# Patient Record
Sex: FEMALE | Race: WHITE | NOT HISPANIC OR LATINO | URBAN - METROPOLITAN AREA
[De-identification: names, ages, dates, MRNs, and addresses within clinical notes are randomized per-mention and may not be internally consistent; named-entity substitution may affect disease eponyms.]

---
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
## 2023-04-21 PROBLEM — Z00.00 ENCOUNTER FOR PREVENTIVE HEALTH EXAMINATION: Status: ACTIVE | Noted: 2023-04-21

## 2023-05-05 ENCOUNTER — APPOINTMENT (OUTPATIENT)
Dept: ENDOCRINOLOGY | Facility: CLINIC | Age: 23
End: 2023-05-05
Payer: COMMERCIAL

## 2023-05-05 VITALS
HEART RATE: 70 BPM | HEIGHT: 62 IN | TEMPERATURE: 97.4 F | WEIGHT: 174 LBS | SYSTOLIC BLOOD PRESSURE: 118 MMHG | BODY MASS INDEX: 32.02 KG/M2 | DIASTOLIC BLOOD PRESSURE: 72 MMHG | OXYGEN SATURATION: 98 %

## 2023-05-05 DIAGNOSIS — N92.6 IRREGULAR MENSTRUATION, UNSPECIFIED: ICD-10-CM

## 2023-05-05 DIAGNOSIS — E88.81 METABOLIC SYNDROME: ICD-10-CM

## 2023-05-05 DIAGNOSIS — Z78.9 OTHER SPECIFIED HEALTH STATUS: ICD-10-CM

## 2023-05-05 DIAGNOSIS — J45.909 UNSPECIFIED ASTHMA, UNCOMPLICATED: ICD-10-CM

## 2023-05-05 DIAGNOSIS — R63.5 ABNORMAL WEIGHT GAIN: ICD-10-CM

## 2023-05-05 PROCEDURE — 99204 OFFICE O/P NEW MOD 45 MIN: CPT

## 2023-05-06 PROBLEM — J45.909 ASTHMA: Status: ACTIVE | Noted: 2023-05-05

## 2023-05-06 PROBLEM — N92.6 IRREGULAR PERIODS: Status: ACTIVE | Noted: 2023-05-06

## 2023-05-06 PROBLEM — E88.81 INSULIN RESISTANCE: Status: ACTIVE | Noted: 2023-05-06

## 2023-05-06 PROBLEM — Z78.9 NON-SMOKER: Status: ACTIVE | Noted: 2023-05-05

## 2023-05-06 NOTE — ASSESSMENT
[FreeTextEntry1] : 22 year old female with history of asthma who present for evaluation of elevated insulin and weight gain , irregular periods\par \par # Menarche at the age of 13-14 \par had regular period\par 1 year ago she was started on OCP and noted change to her weight , took it for around 9 months, then stopped and when she stopped had regular periods for 4-5 months then irregular , had GYN evaluation done and showed mild DHEAS elevation , insulin elevation , she also noted weight gain over past few months \par mild hirsutism , no pregnancy plans now \par \par - will monitor for now if period remain irregular with next 2-3 cycles , she will redo hormonal work up and consider going back on OCP , possible PCOS .

## 2023-05-06 NOTE — PHYSICAL EXAM
[Alert] : alert [Healthy Appearance] : healthy appearance [No Acute Distress] : no acute distress [No Proptosis] : no proptosis [No Lid Lag] : no lid lag [Thyroid Not Enlarged] : the thyroid was not enlarged [No Respiratory Distress] : no respiratory distress [No Accessory Muscle Use] : no accessory muscle use [Clear to Auscultation] : lungs were clear to auscultation bilaterally [Normal S1, S2] : normal S1 and S2 [No Murmurs] : no murmurs [Regular Rhythm] : with a regular rhythm [No Edema] : no peripheral edema [Not Tender] : non-tender [Soft] : abdomen soft [No CVA Tenderness] : no ~M costovertebral angle tenderness [No Stigmata of Cushings Syndrome] : no stigmata of Cushings Syndrome [Abdominal Striae] : no abdominal striae [Acanthosis Nigricans] : no acanthosis nigricans [Acne] : no acne [Hirsutism] : no hirsutism [No Tremors] : no tremors [Oriented x3] : oriented to person, place, and time

## 2023-05-06 NOTE — HISTORY OF PRESENT ILLNESS
[FreeTextEntry1] : 22 year old female with history of asthma who present for evaluation of elevated insulin and weight gain , irregular periods\par \par # Menarche at the age of 13-14 \par had regular period\par 1 year ago she was started on OCP and noted change to her weight , took it for around 9 months, then stopped and when she stopped had regular periods for 4-5 months then irregular , had GYN evaluation done and showed mild DHEAS elevation , insulin elevation , she also noted weight gain over past few months \par mild hirsutism , no pregnancy plans now

## 2023-05-06 NOTE — DATA REVIEWED
[FreeTextEntry1] : 4/2023: total testosterone 26  Free test 3.2 SHBG 31 LDL 74  glucose 98 A1c 5.1% TSH 1.33  Ft4 1 insulin 27.4  QBC 13,4 androstenedione 130 DHEAS 301  FSH 2.6  LH 4.2 prolactin 6.6  HCG negative

## 2023-05-06 NOTE — PAST MEDICAL HISTORY
[Menstruating] : The patient is menstruating [Menarche Age ____] : age at menarche was [unfilled] [Definite ___ (Date)] : the last menstrual period was [unfilled] [Regular Cycle Intervals] : have been regular [Total Preg ___] : G[unfilled] [FreeTextEntry3] : 12/2023

## 2023-05-06 NOTE — REASON FOR VISIT
[Initial Evaluation] : an initial evaluation [Weight Management/Obesity] : weight management/obesity [Other___] : [unfilled] [FreeTextEntry2] : Dr Brito

## 2023-05-06 NOTE — REVIEW OF SYSTEMS
[Fatigue] : no fatigue [Recent Weight Gain (___ Lbs)] : recent weight gain: [unfilled] lbs [Recent Weight Loss (___ Lbs)] : no recent weight loss [Visual Field Defect] : no visual field defect [Blurred Vision] : no blurred vision [Dysphagia] : no dysphagia [Neck Pain] : no neck pain [Dysphonia] : no dysphonia [Chest Pain] : no chest pain [Palpitations] : no palpitations [Fast Heart Rate] : heart rate is not fast [Lower Ext Edema] : no lower extremity edema [Shortness Of Breath] : no shortness of breath [SOB on Exertion] : no shortness of breath on exertion [Nausea] : no nausea [Constipation] : no constipation [Vomiting] : no vomiting [Diarrhea] : no diarrhea [As Noted in HPI] : as noted in HPI [Joint Pain] : no joint pain [Muscle Cramps] : no muscle cramps [Acanthosis] : no acanthosis  [Hirsutism] : no hirsutism [Hair Loss] : no hair loss [Headaches] : no headaches [Tremors] : no tremors [Cold Intolerance] : no cold intolerance [Heat Intolerance] : no heat intolerance

## 2023-10-02 ENCOUNTER — APPOINTMENT (OUTPATIENT)
Dept: ENDOCRINOLOGY | Facility: CLINIC | Age: 23
End: 2023-10-02

## 2023-11-09 ENCOUNTER — APPOINTMENT (OUTPATIENT)
Dept: ENDOCRINOLOGY | Facility: CLINIC | Age: 23
End: 2023-11-09